# Patient Record
Sex: FEMALE | Race: BLACK OR AFRICAN AMERICAN | Employment: UNEMPLOYED | URBAN - METROPOLITAN AREA
[De-identification: names, ages, dates, MRNs, and addresses within clinical notes are randomized per-mention and may not be internally consistent; named-entity substitution may affect disease eponyms.]

---
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
## 2025-02-05 ENCOUNTER — OFFICE VISIT (OUTPATIENT)
Age: 20
End: 2025-02-05
Payer: MEDICAID

## 2025-02-05 VITALS
OXYGEN SATURATION: 100 % | TEMPERATURE: 97.4 F | HEART RATE: 90 BPM | RESPIRATION RATE: 16 BRPM | WEIGHT: 145.6 LBS | BODY MASS INDEX: 24.86 KG/M2 | HEIGHT: 64 IN | DIASTOLIC BLOOD PRESSURE: 80 MMHG | SYSTOLIC BLOOD PRESSURE: 117 MMHG

## 2025-02-05 DIAGNOSIS — E10.65 TYPE 1 DIABETES MELLITUS WITH HYPERGLYCEMIA (HCC): Primary | ICD-10-CM

## 2025-02-05 DIAGNOSIS — E05.00 GRAVES DISEASE: ICD-10-CM

## 2025-02-05 LAB — HBA1C MFR BLD: 13.2 %

## 2025-02-05 PROCEDURE — 95251 CONT GLUC MNTR ANALYSIS I&R: CPT | Performed by: STUDENT IN AN ORGANIZED HEALTH CARE EDUCATION/TRAINING PROGRAM

## 2025-02-05 PROCEDURE — 83036 HEMOGLOBIN GLYCOSYLATED A1C: CPT | Performed by: STUDENT IN AN ORGANIZED HEALTH CARE EDUCATION/TRAINING PROGRAM

## 2025-02-05 PROCEDURE — 99215 OFFICE O/P EST HI 40 MIN: CPT | Performed by: STUDENT IN AN ORGANIZED HEALTH CARE EDUCATION/TRAINING PROGRAM

## 2025-02-05 RX ORDER — HYDROCHLOROTHIAZIDE 12.5 MG/1
CAPSULE ORAL
Qty: 2 EACH | Refills: 2 | Status: SHIPPED | OUTPATIENT
Start: 2025-02-05

## 2025-02-05 RX ORDER — INSULIN DEGLUDEC 100 U/ML
INJECTION, SOLUTION SUBCUTANEOUS
Qty: 30 ADJUSTABLE DOSE PRE-FILLED PEN SYRINGE | Refills: 3 | Status: SHIPPED | OUTPATIENT
Start: 2025-02-05

## 2025-02-05 RX ORDER — BLOOD SUGAR DIAGNOSTIC
STRIP MISCELLANEOUS
Qty: 200 EACH | Refills: 2 | Status: SHIPPED | OUTPATIENT
Start: 2025-02-05

## 2025-02-05 RX ORDER — LANCETS
EACH MISCELLANEOUS
Qty: 200 EACH | Refills: 2 | Status: SHIPPED | OUTPATIENT
Start: 2025-02-05

## 2025-02-05 ASSESSMENT — PATIENT HEALTH QUESTIONNAIRE - PHQ9
SUM OF ALL RESPONSES TO PHQ QUESTIONS 1-9: 0
SUM OF ALL RESPONSES TO PHQ9 QUESTIONS 1 & 2: 0
2. FEELING DOWN, DEPRESSED OR HOPELESS: NOT AT ALL
SUM OF ALL RESPONSES TO PHQ QUESTIONS 1-9: 0
1. LITTLE INTEREST OR PLEASURE IN DOING THINGS: NOT AT ALL

## 2025-02-05 NOTE — PROGRESS NOTES
[facility] Encounter with Adeline Vanegas for follow up of Type 1 Diabetes. Accompanied today by self.      [address], [facility]    Start time: 9:41 AM EST  End Time: 9:56 AM EST    Total time: 13 minutes spent with this clinician      Complete insulin delivery via: InPen smart pen  helped with set up in new phone so settings match prescribed insulin.  Had her re-pair the pen and was linked after this Has been using two apps so data is in two phones    Insights from device download: Didi 3 with phone ana  data only through 1/25/25 and no meter with her today.  Pt report a lot of trouble getting the Didi 3, discussed differences with Didi 2+ and Didi 3 +.  She requested to see if we can get 3+ and I shared it appears to be on formulary now.     Increased amount of insulin dispensed for her Tresiba as only one box ordered and using 1960 ml in a month    Still reports not ready for an insulin pump. Does not want a second device attached.     Very High (Above 250 mg/dL): 79 %  High (181-249 mg/dl): 12 %  Time in Range (  mg/dl): 9 %  Low (55- 69 mg/dl): 0 %  Very Low (Below 54 mg/dL): 0 %    TDD/TDI: na units     Insulin Instructions  Fixed Dose Injections   Tresiba FlexTouch 100 UNIT/ML Sopn   Last edited by Chuck Short MD on 10/21/2024 at 9:15 AM      Time of Day Dose (units)   Once daily 68     Mealtime Injections   HumaLOG 100 UNIT/ML Soct   Last edited by Chuck Short MD on 8/6/2024 at 9:29 AM      For glucose corrections, patient is instructed to round their insulin dose down to the nearest multiple of 1 unit.      Mealtime Carb Ratio (g/unit) Sensitivity Factor (mg/dL/unit) BG Target (mg/dL)   ALL 6 20 100        Diagnosis date: 11/23/2020   Length of diabetes diagnosis: 4 years      Recent Results (from the past 12 hour(s))   AMB POC HEMOGLOBIN A1C    Collection Time: 02/05/25  9:49 AM   Result Value Ref Range    Hemoglobin A1C, POC 13.2 %       Hemoglobin A1C, POC   Date Value Ref Range
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
## 2025-02-05 NOTE — PATIENT INSTRUCTIONS
Be Prepared-Emergency Supplies  Should be with you anytime you are out of the house; do not leave in a hot car and bring into the office for your appointments    Your rapid acting insulin pen and pen needles- expires after 28 days once open  Alcohol wipes  Ketone strips- judy with expiration date once open  Glucagon emergency kit  Blood glucose meter, test strips, lancing device and lancets  3 sources of treatment for low blood sugar such as Starburst, 4 oz fruit  juice boxes, glucose tabs, skittles, or fruit snacks  If using:  back up Dexcom or Didi glucose sensor-remember to use meter for low Blood glucose alerts  A copy of your insulin dosing.  Provided in your discharge instructions after every visit  [employer] (Didi) Customer Service [phone].       Thyroid labs repeat today    Please call to get a replacement for your failed sensor :    [employer] (Didi) Customer Service [phone].      Roland Butt with [employer]  [phone]    Will send thyroid labs today  Will contact you with results to discuss    Follow-up for type 1 diabetes, Graves' disease.  Hemoglobin A1c today: 13.2% . Target is <7.0%.     Goal Blood Sugars .       Plan  Importance of compliance reinforced   Send us records in a week to review for any insulin dose adjustements  Review checking ketones when vomiting, 2 consecutive blood glucose above 350,  illness  When trace or small drink more water and keep checking until negative. If moderate or large give us a call #[phone]  Target before activity >120, if below get something with carbs,protein and fat (granula bar)     Yearly eye exams are recommended after you have had diabetes for 3-5 years  Dental exams every 6 months are recommended  Flu vaccine is recommended every year, as early in the season as possible  Medical ID should be worn at all times  Continue rotating injection/insertion sites  Annual labs are due:         Insulin regimen      Insulin Instructions  Fixed Dose

## 2025-02-08 DIAGNOSIS — E10.65 TYPE 1 DIABETES MELLITUS WITH HYPERGLYCEMIA (HCC): ICD-10-CM

## 2025-02-10 RX ORDER — INSULIN DEGLUDEC 100 U/ML
INJECTION, SOLUTION SUBCUTANEOUS
Qty: 30 ML | Refills: 3 | Status: SHIPPED | OUTPATIENT
Start: 2025-02-10

## 2025-03-04 ENCOUNTER — TELEPHONE (OUTPATIENT)
Age: 20
End: 2025-03-04

## 2025-03-04 NOTE — TELEPHONE ENCOUNTER
Spoke with pt and she provided fax number and she read back her instructions for tonight and tomorrow.

## 2025-03-04 NOTE — TELEPHONE ENCOUNTER
Patient Adeline says she is having surgery tomorrow and would like to know if it is okay for her to get sedated.    Please advise.    Patient [phone]

## 2025-03-04 NOTE — TELEPHONE ENCOUNTER
Per Dr. Short    Carb ratio : 1:10 tonight at dinner  Tresiba at 60 units tomorrow morning    1 hour before procedure needs to be > 100 mg/dl   Check every hour during procedure and goal is to be > 100 mg/dl     [facility]  Fax number [phone]

## 2025-03-04 NOTE — TELEPHONE ENCOUNTER
Insulin Instructions  Fixed Dose Injections   Tresiba FlexTouch 100 UNIT/ML Sopn   Last edited by Chuck Short MD on 10/21/2024 at 9:15 AM      Time of Day Dose (units)   Once daily 68     Mealtime Injections   HumaLOG 100 UNIT/ML Soct   Last edited by Chuck Short MD on 8/6/2024 at 9:29 AM      For glucose corrections, patient is instructed to round their insulin dose down to the nearest multiple of 1 unit.      Mealtime Carb Ratio (g/unit) Sensitivity Factor (mg/dL/unit) BG Target (mg/dL)   ALL 6 20 100

## 2025-07-31 ENCOUNTER — HOSPITAL ENCOUNTER (INPATIENT)
Facility: HOSPITAL | Age: 20
LOS: 2 days | Discharge: HOME | End: 2025-08-02
Attending: PEDIATRICS | Admitting: PEDIATRICS

## 2025-07-31 DIAGNOSIS — E10.65 TYPE 1 DIABETES MELLITUS WITH HYPERGLYCEMIA (HCC): ICD-10-CM

## 2025-07-31 DIAGNOSIS — E10.10 TYPE 1 DIABETES MELLITUS WITH KETOACIDOSIS WITHOUT COMA (HCC): Primary | ICD-10-CM

## 2025-07-31 DIAGNOSIS — E05.00 THYROTOXICOSIS WITH DIFFUSE GOITER WITHOUT THYROTOXIC CRISIS OR STORM: ICD-10-CM

## 2025-07-31 LAB
ANION GAP BLD CALC-SCNC: 11 (ref 10–20)
ANION GAP BLD CALC-SCNC: 11 (ref 10–20)
ANION GAP BLD CALC-SCNC: ABNORMAL (ref 10–20)
ANION GAP SERPL CALC-SCNC: 19 MMOL/L (ref 2–14)
ANION GAP SERPL CALC-SCNC: 22 MMOL/L (ref 2–14)
ANION GAP SERPL CALC-SCNC: 27 MMOL/L (ref 2–14)
ANION GAP SERPL CALC-SCNC: 28 MMOL/L (ref 2–14)
APPEARANCE UR: CLEAR
BACTERIA URNS QL MICRO: NEGATIVE /HPF
BASE DEFICIT BLD-SCNC: 14.2 MMOL/L
BASE DEFICIT BLD-SCNC: 18.7 MMOL/L
BASE DEFICIT BLD-SCNC: 24.8 MMOL/L
BASE DEFICIT BLD-SCNC: 25.3 MMOL/L
BASE DEFICIT BLD-SCNC: 26.1 MMOL/L
BASE DEFICIT BLD-SCNC: 27.4 MMOL/L
BASE DEFICIT BLD-SCNC: 27.7 MMOL/L
BASOPHILS # BLD: 0.1 K/UL (ref 0–0.1)
BASOPHILS NFR BLD: 0.5 % (ref 0–1)
BILIRUB UR QL: NEGATIVE
BUN SERPL-MCNC: 10 MG/DL (ref 6–20)
BUN SERPL-MCNC: 11 MG/DL (ref 6–20)
BUN SERPL-MCNC: 11 MG/DL (ref 6–20)
BUN SERPL-MCNC: 9 MG/DL (ref 6–20)
BUN/CREAT SERPL: 10 (ref 12–20)
BUN/CREAT SERPL: 11 (ref 12–20)
CA-I BLD-MCNC: 1.06 MMOL/L (ref 1.15–1.33)
CA-I BLD-MCNC: 1.15 MMOL/L (ref 1.15–1.33)
CA-I BLD-MCNC: 1.18 MMOL/L (ref 1.15–1.33)
CA-I BLD-MCNC: 1.21 MMOL/L (ref 1.15–1.33)
CA-I BLD-MCNC: 1.23 MMOL/L (ref 1.15–1.33)
CA-I BLD-MCNC: 1.23 MMOL/L (ref 1.15–1.33)
CA-I BLD-MCNC: 1.27 MMOL/L (ref 1.15–1.33)
CALCIUM SERPL-MCNC: 7.4 MG/DL (ref 8.6–10)
CALCIUM SERPL-MCNC: 8.8 MG/DL (ref 8.6–10)
CALCIUM SERPL-MCNC: 9.2 MG/DL (ref 8.6–10)
CALCIUM SERPL-MCNC: 9.3 MG/DL (ref 8.6–10)
CHLORIDE BLD-SCNC: 118 MMOL/L (ref 100–111)
CHLORIDE BLD-SCNC: 119 MMOL/L (ref 100–111)
CHLORIDE BLD-SCNC: 123 MMOL/L (ref 100–111)
CHLORIDE BLD-SCNC: 124 MMOL/L (ref 100–111)
CHLORIDE BLD-SCNC: 125 MMOL/L (ref 100–111)
CHLORIDE BLD-SCNC: 127 MMOL/L (ref 100–111)
CHLORIDE BLD-SCNC: 130 MMOL/L (ref 100–111)
CHLORIDE SERPL-SCNC: 102 MMOL/L (ref 98–107)
CHLORIDE SERPL-SCNC: 105 MMOL/L (ref 98–107)
CHLORIDE SERPL-SCNC: 116 MMOL/L (ref 98–107)
CHLORIDE SERPL-SCNC: 117 MMOL/L (ref 98–107)
CO2 BLD-SCNC: 10 MMOL/L (ref 22–29)
CO2 BLD-SCNC: 7 MMOL/L (ref 22–29)
CO2 BLD-SCNC: <5 MMOL/L (ref 22–29)
CO2 SERPL-SCNC: 3 MMOL/L (ref 20–29)
CO2 SERPL-SCNC: 3 MMOL/L (ref 20–29)
CO2 SERPL-SCNC: 4 MMOL/L (ref 20–29)
CO2 SERPL-SCNC: 7 MMOL/L (ref 20–29)
COLOR UR: ABNORMAL
COMMENT:: NORMAL
CREAT SERPL-MCNC: 0.91 MG/DL (ref 0.6–1)
CREAT SERPL-MCNC: 0.96 MG/DL (ref 0.6–1)
CREAT SERPL-MCNC: 1.05 MG/DL (ref 0.6–1)
CREAT SERPL-MCNC: 1.15 MG/DL (ref 0.6–1)
CREAT UR-MCNC: 0.6 MG/DL (ref 0.6–1.3)
CREAT UR-MCNC: 0.6 MG/DL (ref 0.6–1.3)
CREAT UR-MCNC: 0.7 MG/DL (ref 0.6–1.3)
CREAT UR-MCNC: 0.8 MG/DL (ref 0.6–1.3)
CREAT UR-MCNC: 0.8 MG/DL (ref 0.6–1.3)
DIFFERENTIAL METHOD BLD: ABNORMAL
EOSINOPHIL # BLD: 0.02 K/UL (ref 0–0.4)
EOSINOPHIL NFR BLD: 0.1 % (ref 0–7)
EPITH CASTS URNS QL MICRO: ABNORMAL /LPF
ERYTHROCYTE [DISTWIDTH] IN BLOOD BY AUTOMATED COUNT: 12.3 % (ref 11.5–14.5)
EST. AVERAGE GLUCOSE BLD GHB EST-MCNC: 375 MG/DL
GLUCOSE BLD STRIP.AUTO-MCNC: 140 MG/DL (ref 65–117)
GLUCOSE BLD STRIP.AUTO-MCNC: 161 MG/DL (ref 65–117)
GLUCOSE BLD STRIP.AUTO-MCNC: 167 MG/DL (ref 65–117)
GLUCOSE BLD STRIP.AUTO-MCNC: 168 MG/DL (ref 65–117)
GLUCOSE BLD STRIP.AUTO-MCNC: 171 MG/DL (ref 74–99)
GLUCOSE BLD STRIP.AUTO-MCNC: 173 MG/DL (ref 65–117)
GLUCOSE BLD STRIP.AUTO-MCNC: 186 MG/DL (ref 65–117)
GLUCOSE BLD STRIP.AUTO-MCNC: 191 MG/DL (ref 65–117)
GLUCOSE BLD STRIP.AUTO-MCNC: 197 MG/DL (ref 74–99)
GLUCOSE BLD STRIP.AUTO-MCNC: 211 MG/DL (ref 65–117)
GLUCOSE BLD STRIP.AUTO-MCNC: 263 MG/DL (ref 65–117)
GLUCOSE BLD STRIP.AUTO-MCNC: 362 MG/DL (ref 74–99)
GLUCOSE BLD STRIP.AUTO-MCNC: 381 MG/DL (ref 65–117)
GLUCOSE BLD STRIP.AUTO-MCNC: 408 MG/DL (ref 74–99)
GLUCOSE BLD STRIP.AUTO-MCNC: 462 MG/DL (ref 65–117)
GLUCOSE BLD STRIP.AUTO-MCNC: 511 MG/DL (ref 65–117)
GLUCOSE BLD STRIP.AUTO-MCNC: 537 MG/DL (ref 65–117)
GLUCOSE BLD STRIP.AUTO-MCNC: 541 MG/DL (ref 74–99)
GLUCOSE BLD STRIP.AUTO-MCNC: >600 MG/DL (ref 65–117)
GLUCOSE BLD STRIP.AUTO-MCNC: >700 MG/DL (ref 74–99)
GLUCOSE BLD STRIP.AUTO-MCNC: >700 MG/DL (ref 74–99)
GLUCOSE SERPL-MCNC: 170 MG/DL (ref 65–100)
GLUCOSE SERPL-MCNC: 377 MG/DL (ref 65–100)
GLUCOSE SERPL-MCNC: 432 MG/DL (ref 65–100)
GLUCOSE SERPL-MCNC: 611 MG/DL (ref 65–100)
GLUCOSE UR STRIP.AUTO-MCNC: >1000 MG/DL
HBA1C MFR BLD: 14.7 % (ref 4–5.6)
HCG UR QL: NEGATIVE
HCO3 BLDA-SCNC: 10 MMOL/L
HCO3 BLDA-SCNC: 3 MMOL/L
HCO3 BLDA-SCNC: 4 MMOL/L
HCO3 BLDA-SCNC: 4 MMOL/L
HCO3 BLDA-SCNC: 7 MMOL/L
HCT VFR BLD AUTO: 44.9 % (ref 35–47)
HGB BLD-MCNC: 14 G/DL (ref 11.5–16)
HGB UR QL STRIP: NEGATIVE
HYALINE CASTS URNS QL MICRO: ABNORMAL /LPF (ref 0–5)
IMM GRANULOCYTES # BLD AUTO: 0.19 K/UL (ref 0–0.04)
IMM GRANULOCYTES NFR BLD AUTO: 1 % (ref 0–0.5)
KETONES UR QL STRIP.AUTO: >80 MG/DL
LACTATE BLD-SCNC: 2.93 MMOL/L (ref 0.4–2)
LACTATE BLD-SCNC: 3.06 MMOL/L (ref 0.4–2)
LACTATE BLD-SCNC: 3.99 MMOL/L (ref 0.4–2)
LACTATE BLD-SCNC: 4.12 MMOL/L (ref 0.4–2)
LACTATE BLD-SCNC: 4.47 MMOL/L (ref 0.4–2)
LACTATE BLD-SCNC: 4.6 MMOL/L (ref 0.4–2)
LEUKOCYTE ESTERASE UR QL STRIP.AUTO: NEGATIVE
LYMPHOCYTES # BLD: 2.6 K/UL (ref 0.8–3.5)
LYMPHOCYTES NFR BLD: 14.1 % (ref 12–49)
MAGNESIUM SERPL-MCNC: 1.9 MG/DL (ref 1.7–2.2)
MAGNESIUM SERPL-MCNC: 2.1 MG/DL (ref 1.7–2.2)
MAGNESIUM SERPL-MCNC: 2.5 MG/DL (ref 1.7–2.2)
MCH RBC QN AUTO: 30.2 PG (ref 26–34)
MCHC RBC AUTO-ENTMCNC: 31.2 G/DL (ref 30–36.5)
MCV RBC AUTO: 97 FL (ref 80–99)
MONOCYTES # BLD: 0.49 K/UL (ref 0–1)
MONOCYTES NFR BLD: 2.7 % (ref 5–13)
NEUTS SEG # BLD: 15.08 K/UL (ref 1.8–8)
NEUTS SEG NFR BLD: 81.6 % (ref 32–75)
NITRITE UR QL STRIP.AUTO: NEGATIVE
NRBC # BLD: 0 K/UL (ref 0–0.01)
NRBC BLD-RTO: 0 PER 100 WBC
PCO2 BLDC: 21.9 MMHG (ref 45–55)
PCO2 BLDV: 11.1 MMHG (ref 41–51)
PCO2 BLDV: 15.8 MMHG (ref 41–51)
PCO2 BLDV: 16.3 MMHG (ref 41–51)
PCO2 BLDV: 17.4 MMHG (ref 41–51)
PCO2 BLDV: 18.2 MMHG (ref 41–51)
PCO2 BLDV: 20.3 MMHG (ref 41–51)
PH BLDC: 7.28 (ref 7.32–7.42)
PH BLDV: 6.93 (ref 7.32–7.42)
PH BLDV: 6.95 (ref 7.32–7.42)
PH BLDV: 6.97 (ref 7.32–7.42)
PH BLDV: 7.02 (ref 7.32–7.42)
PH BLDV: 7.05 (ref 7.32–7.42)
PH BLDV: 7.17 (ref 7.32–7.42)
PH UR STRIP: 5 (ref 5–8)
PHOSPHATE SERPL-MCNC: 2.2 MG/DL (ref 2.4–4.5)
PHOSPHATE SERPL-MCNC: 4.4 MG/DL (ref 2.4–4.5)
PHOSPHATE SERPL-MCNC: 4.5 MG/DL (ref 2.4–4.5)
PLATELET # BLD AUTO: 362 K/UL (ref 150–400)
PMV BLD AUTO: 10.6 FL (ref 8.9–12.9)
PO2 BLDC: 83 MMHG (ref 40–50)
PO2 BLDV: 37 MMHG (ref 25–40)
PO2 BLDV: 47 MMHG (ref 25–40)
PO2 BLDV: 48 MMHG (ref 25–40)
PO2 BLDV: 50 MMHG (ref 25–40)
PO2 BLDV: 68 MMHG (ref 25–40)
PO2 BLDV: 68 MMHG (ref 25–40)
POTASSIUM BLD-SCNC: 4.5 MMOL/L (ref 3.5–5.5)
POTASSIUM BLD-SCNC: 5.3 MMOL/L (ref 3.5–5.5)
POTASSIUM BLD-SCNC: 5.5 MMOL/L (ref 3.5–5.5)
POTASSIUM BLD-SCNC: 5.8 MMOL/L (ref 3.5–5.5)
POTASSIUM BLD-SCNC: 5.9 MMOL/L (ref 3.5–5.5)
POTASSIUM BLD-SCNC: 6.5 MMOL/L (ref 3.5–5.5)
POTASSIUM BLD-SCNC: 7.4 MMOL/L (ref 3.5–5.5)
POTASSIUM SERPL-SCNC: 5.1 MMOL/L (ref 3.5–5.1)
POTASSIUM SERPL-SCNC: 5.5 MMOL/L (ref 3.5–5.1)
POTASSIUM SERPL-SCNC: 6.5 MMOL/L (ref 3.5–5.1)
POTASSIUM SERPL-SCNC: ABNORMAL MMOL/L (ref 3.5–5.1)
PROT UR STRIP-MCNC: ABNORMAL MG/DL
RBC # BLD AUTO: 4.63 M/UL (ref 3.8–5.2)
RBC #/AREA URNS HPF: ABNORMAL /HPF (ref 0–5)
SAO2 % BLD: 58 % (ref 94–98)
SAO2 % BLD: 61 % (ref 94–98)
SAO2 % BLD: 61 % (ref 94–98)
SAO2 % BLD: 63 % (ref 94–98)
SAO2 % BLD: 79 % (ref 94–98)
SAO2 % BLD: 84 % (ref 94–98)
SAO2 % BLD: 95 % (ref 94–98)
SERVICE CMNT-IMP: ABNORMAL
SODIUM BLD-SCNC: 121 MMOL/L (ref 136–145)
SODIUM BLD-SCNC: 130 MMOL/L (ref 136–145)
SODIUM BLD-SCNC: 135 MMOL/L (ref 136–145)
SODIUM BLD-SCNC: 139 MMOL/L (ref 136–145)
SODIUM BLD-SCNC: 143 MMOL/L (ref 136–145)
SODIUM BLD-SCNC: 145 MMOL/L (ref 136–145)
SODIUM BLD-SCNC: 146 MMOL/L (ref 136–145)
SODIUM SERPL-SCNC: 132 MMOL/L (ref 136–145)
SODIUM SERPL-SCNC: 136 MMOL/L (ref 136–145)
SODIUM SERPL-SCNC: 142 MMOL/L (ref 136–145)
SODIUM SERPL-SCNC: 143 MMOL/L (ref 136–145)
SP GR UR REFRACTOMETRY: 1.02 (ref 1–1.03)
SPECIMEN HOLD: NORMAL
SPECIMEN HOLD: NORMAL
SPECIMEN SITE: ABNORMAL
T4 FREE SERPL-MCNC: 0.9 NG/DL (ref 0.9–1.6)
TSH, 3RD GENERATION: 0.38 UIU/ML (ref 0.27–4.2)
UROBILINOGEN UR QL STRIP.AUTO: 0.2 EU/DL (ref 0.2–1)
WBC # BLD AUTO: 18.5 K/UL (ref 3.6–11)
WBC URNS QL MICRO: ABNORMAL /HPF (ref 0–4)

## 2025-07-31 PROCEDURE — 6370000000 HC RX 637 (ALT 250 FOR IP): Performed by: PEDIATRICS

## 2025-07-31 PROCEDURE — 84439 ASSAY OF FREE THYROXINE: CPT

## 2025-07-31 PROCEDURE — 6360000002 HC RX W HCPCS: Performed by: PEDIATRICS

## 2025-07-31 PROCEDURE — 83735 ASSAY OF MAGNESIUM: CPT

## 2025-07-31 PROCEDURE — 80048 BASIC METABOLIC PNL TOTAL CA: CPT

## 2025-07-31 PROCEDURE — 82947 ASSAY GLUCOSE BLOOD QUANT: CPT

## 2025-07-31 PROCEDURE — 84100 ASSAY OF PHOSPHORUS: CPT

## 2025-07-31 PROCEDURE — 83036 HEMOGLOBIN GLYCOSYLATED A1C: CPT

## 2025-07-31 PROCEDURE — 2500000003 HC RX 250 WO HCPCS: Performed by: PEDIATRICS

## 2025-07-31 PROCEDURE — 81025 URINE PREGNANCY TEST: CPT

## 2025-07-31 PROCEDURE — 82330 ASSAY OF CALCIUM: CPT

## 2025-07-31 PROCEDURE — 2030000000 HC ICU PEDIATRIC R&B

## 2025-07-31 PROCEDURE — 85025 COMPLETE CBC W/AUTO DIFF WBC: CPT

## 2025-07-31 PROCEDURE — 84132 ASSAY OF SERUM POTASSIUM: CPT

## 2025-07-31 PROCEDURE — 81001 URINALYSIS AUTO W/SCOPE: CPT

## 2025-07-31 PROCEDURE — 2580000003 HC RX 258: Performed by: PEDIATRICS

## 2025-07-31 PROCEDURE — 82803 BLOOD GASES ANY COMBINATION: CPT

## 2025-07-31 PROCEDURE — 99285 EMERGENCY DEPT VISIT HI MDM: CPT

## 2025-07-31 PROCEDURE — 82962 GLUCOSE BLOOD TEST: CPT

## 2025-07-31 PROCEDURE — 84295 ASSAY OF SERUM SODIUM: CPT

## 2025-07-31 PROCEDURE — 84443 ASSAY THYROID STIM HORMONE: CPT

## 2025-07-31 PROCEDURE — 99223 1ST HOSP IP/OBS HIGH 75: CPT | Performed by: PEDIATRICS

## 2025-07-31 RX ORDER — METHIMAZOLE 5 MG/1
20 TABLET ORAL 2 TIMES DAILY
Status: DISCONTINUED | OUTPATIENT
Start: 2025-07-31 | End: 2025-08-02 | Stop reason: HOSPADM

## 2025-07-31 RX ORDER — DIAZEPAM 10 MG/2ML
2.5 INJECTION, SOLUTION INTRAMUSCULAR; INTRAVENOUS EVERY 4 HOURS PRN
Status: DISCONTINUED | OUTPATIENT
Start: 2025-07-31 | End: 2025-08-01

## 2025-07-31 RX ORDER — SODIUM CHLORIDE 9 MG/ML
INJECTION, SOLUTION INTRAVENOUS ONCE
Status: COMPLETED | OUTPATIENT
Start: 2025-07-31 | End: 2025-07-31

## 2025-07-31 RX ORDER — KETOROLAC TROMETHAMINE 30 MG/ML
30 INJECTION, SOLUTION INTRAMUSCULAR; INTRAVENOUS ONCE
Status: COMPLETED | OUTPATIENT
Start: 2025-07-31 | End: 2025-07-31

## 2025-07-31 RX ORDER — SODIUM CHLORIDE 9 MG/ML
INJECTION, SOLUTION INTRAVENOUS CONTINUOUS
Status: DISCONTINUED | OUTPATIENT
Start: 2025-07-31 | End: 2025-08-02

## 2025-07-31 RX ORDER — CALCIUM GLUCONATE 20 MG/ML
1000 INJECTION, SOLUTION INTRAVENOUS ONCE
Status: COMPLETED | OUTPATIENT
Start: 2025-07-31 | End: 2025-07-31

## 2025-07-31 RX ORDER — ACETAMINOPHEN 325 MG/1
650 TABLET ORAL EVERY 4 HOURS PRN
Status: DISCONTINUED | OUTPATIENT
Start: 2025-07-31 | End: 2025-08-02 | Stop reason: HOSPADM

## 2025-07-31 RX ORDER — CALCIUM GLUCONATE 98 MG/ML
1000 INJECTION, SOLUTION INTRAVENOUS ONCE
Status: DISCONTINUED | OUTPATIENT
Start: 2025-07-31 | End: 2025-07-31 | Stop reason: SDUPTHER

## 2025-07-31 RX ADMIN — SODIUM CHLORIDE 0.05 UNITS/KG/HR: 9 INJECTION, SOLUTION INTRAVENOUS at 09:32

## 2025-07-31 RX ADMIN — DIAZEPAM 2.5 MG: 5 INJECTION, SOLUTION INTRAMUSCULAR; INTRAVENOUS at 09:50

## 2025-07-31 RX ADMIN — POTASSIUM PHOSPHATE, MONOBASIC POTASSIUM PHOSPHATE, DIBASIC: 224; 236 INJECTION, SOLUTION, CONCENTRATE INTRAVENOUS at 09:57

## 2025-07-31 RX ADMIN — CALCIUM GLUCONATE 1000 MG: 20 INJECTION, SOLUTION INTRAVENOUS at 18:02

## 2025-07-31 RX ADMIN — POTASSIUM PHOSPHATE, MONOBASIC POTASSIUM PHOSPHATE, DIBASIC: 224; 236 INJECTION, SOLUTION, CONCENTRATE INTRAVENOUS at 16:48

## 2025-07-31 RX ADMIN — SODIUM CHLORIDE: 0.9 INJECTION, SOLUTION INTRAVENOUS at 12:27

## 2025-07-31 RX ADMIN — KETOROLAC TROMETHAMINE 30 MG: 30 INJECTION, SOLUTION INTRAMUSCULAR; INTRAVENOUS at 09:17

## 2025-07-31 RX ADMIN — SODIUM CHLORIDE: 0.9 INJECTION, SOLUTION INTRAVENOUS at 08:37

## 2025-07-31 RX ADMIN — METHIMAZOLE 20 MG: 5 TABLET ORAL at 20:25

## 2025-07-31 RX ADMIN — ACETAMINOPHEN 650 MG: 325 TABLET ORAL at 20:25

## 2025-07-31 RX ADMIN — ACETAMINOPHEN 650 MG: 325 TABLET ORAL at 15:07

## 2025-07-31 ASSESSMENT — PAIN SCALES - GENERAL
PAINLEVEL_OUTOF10: 3
PAINLEVEL_OUTOF10: 7
PAINLEVEL_OUTOF10: 7
PAINLEVEL_OUTOF10: 0
PAINLEVEL_OUTOF10: 6

## 2025-07-31 ASSESSMENT — PAIN DESCRIPTION - LOCATION
LOCATION: RIB CAGE
LOCATION: HEAD
LOCATION: ABDOMEN;CHEST

## 2025-07-31 ASSESSMENT — PAIN DESCRIPTION - DESCRIPTORS
DESCRIPTORS: ACHING
DESCRIPTORS: ACHING

## 2025-07-31 ASSESSMENT — PAIN DESCRIPTION - PAIN TYPE: TYPE: ACUTE PAIN

## 2025-07-31 NOTE — CONSULTS
Cc: Type 1 diabetes- poorly controlled and poor compliance with medication         Admitted for Diabetes ketoacidosis    \[facility]\"": Patient is 19 year old with type 1 diabetes admitted for diabetes ketoacidosis. The patient is currently on Insulin regimen at home which includes humalog  before each meal and Tresiba once a day. Compliance with medication: poor. As per the mother who is at bedside, patient could not have medication for diabetes covered. She has been titration herself less insulin dose to have her supplies last long.  Overall glycemic control has been poor. She presented with respiratory distress, lethargy and labs was consistent with hyperglycemia and was in severe DKA.   She is on IV insulin therapy and IV fluids management.    She has hyperthyroidism and is on methimazole.      Past Medical History:   Diagnosis Date    Asthma     Chronic idiopathic constipation 04/25/2018    Concussion     Diabetes (HCC)     Hyperthyroidism 11/20/2020    Migraines     Otitis media        Past Surgical History:   Procedure Laterality Date    CATARACT EXTRACTION Bilateral     TYMPANOSTOMY TUBE PLACEMENT      WISDOM TOOTH EXTRACTION         Social History     Socioeconomic History    Marital status: Single     Spouse name: Not on file    Number of children: Not on file    Years of education: Not on file    Highest education level: Not on file   Occupational History    Not on file   Tobacco Use    Smoking status: Never    Smokeless tobacco: Never   Substance and Sexual Activity    Alcohol use: No    Drug use: No    Sexual activity: Not on file   Other Topics Concern    Not on file   Social History Narrative    Not on file     Social Drivers of Health     Financial Resource Strain: Not on file   Food Insecurity: No Food Insecurity (7/31/2025)    Hunger Vital Sign     Worried About Running Out of Food in the Last Year: Never true     Ran Out of Food in the Last Year: Never true   Transportation Needs: Unmet Transportation  Needs (7/31/2025)    PRAPARE - Transportation     Lack of Transportation (Medical): Yes     Lack of Transportation (Non-Medical): Yes   Physical Activity: Not on file   Stress: Not on file   Social Connections: Not on file   Intimate Partner Violence: Not on file   Housing Stability: High Risk (7/31/2025)    Housing Stability Vital Sign     Unable to Pay for Housing in the Last Year: Yes     Number of Times Moved in the Last Year: 0     Homeless in the Last Year: No       Family History   Problem Relation Age of Onset    Heart Disease Other     Hypertension Maternal Grandmother     Thyroid Disease Mother     Asthma Father     GERD Father     Asthma Sister     Asthma Brother     Diabetes Maternal Grandmother       ROS: lethargy, cannot respond to the questions..     /84   Pulse (!) 135   Temp 98.5 °F (36.9 °C) (Axillary)   Resp 28   Ht 1.6 m (5' 3\")   Wt 62.1 kg (136 lb 14.5 oz)   LMP 07/24/2025 (Approximate)   SpO2 90%   BMI 24.25 kg/m²     Tachycardia, orientation: lethargic and has kusummal breathing. Abdomen: no distension, no pedal edema    A/P:  Type 1 diabetes  Admitted for Diabetes ketoacidosis: severe ketoacidosis  Moderate dehydration  Lethargy  Hyperthyroidism-- biochemically euthyroid    Lab Results   Component Value Date    TSH 0.377 07/31/2025    U6DQAWJ 104 09/17/2024    FT3 >30.0 (H) 11/20/2020    T4FREE 0.9 07/31/2025       Insulin dosing: and IV fluids per PICU protocol.  Lab Results   Component Value Date/Time     07/31/2025 12:20 PM    K 6.5 07/31/2025 12:20 PM     07/31/2025 12:20 PM    CO2 3 07/31/2025 12:20 PM    BUN 11 07/31/2025 12:20 PM    CREATININE 0.6 07/31/2025 04:25 PM    CREATININE 1.15 07/31/2025 12:20 PM    GLUCOSE 432 07/31/2025 12:20 PM    CALCIUM 9.2 07/31/2025 12:20 PM    LABGLOM 70 07/31/2025 12:20 PM    LABGLOM >60 12/07/2023 02:13 AM    LABGLOM Cannot be calculated 12/16/2022 06:15 AM      Hemoglobin A1C   Date Value Ref Range Status   07/31/2025 14.7

## 2025-07-31 NOTE — ED TRIAGE NOTES
Per EMS: pt called for nausea and vomiting. Pt reports hx of diabetes. Pt ran out of long acting insulin. Last dose of short acting insulin was yesterday. Pt received about 600ml of NS en route and arrived on O2 nasal cannula for SOB.

## 2025-07-31 NOTE — BH NOTE
Patient and Family Check in    This worker met with the patient and his mother at bedside both in the morning and in the afternoon.  Patient was in DKA and was not able to effectively communicate to this worker. Father of patient left and mother of patient was not forthcoming with information. 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 This worker offered to return tomorrow to check in with Adeline and speak with her about her needs.

## 2025-07-31 NOTE — ED PROVIDER NOTES
DISPOSITION/PLAN   DISPOSITION Admitted 07/31/2025 08:13:50 AM      PATIENT REFERRED TO:  No follow-up provider specified.    DISCHARGE MEDICATIONS:  Current Discharge Medication List            (Please note that portions of this note were completed with a voice recognition program.  Efforts were made to edit the dictations but occasionally words are mis-transcribed.)    Dinah Garcia MD (electronically signed)  Emergency Attending Physician / Physician Assistant / Nurse Practitioner             Dinah Garcia MD  07/31/25 8183

## 2025-07-31 NOTE — CARE COORDINATION
Care Management Initial Assessment       RUR: 12%--low  Readmission? No  1st IM letter given? No  1st  letter given: No    Emergency contact: Tiffanie Oneil, mother, [phone]     CM consult placed for assistance with insurance and medications. Patient admitted for DKA with history of Diabetes type 1, Graves disease, cataracts and PTSD. CM met with patient and mother at bedside. Patient was sleeping during encounter. CM introduced herself and role. Demographics confirmed. Patient resides in a home with her mother and three siblings. Mother explained that Medicaid coverage was ended in February or March. Mother applied for a plan through the Marketplace, but was unable to add patient due to her age. Patient has been applied for Medicaid and has the application number on her phone. CM emailed [facility] staff to provide them with this information. Patient does need a new PCP. Patient will most likely need assistance with prescription assistance as this hospitalization is due to her running out of her long-acting insulin. CM will continue to follow for discharge needs.     07/31/25 9227   Service Assessment   Patient Orientation Unable to Assess  (patient sleeping)   History Provided By Child/Family   Primary Caregiver Family   Accompanied By/Relationship mother   Support Systems Parent   PCP Verified by CM No  (has no current pediatrician)   Prior Functional Level Independent in ADLs/IADLs   Current Functional Level Independent in ADLs/IADLs   Can patient return to prior living arrangement Yes   Ability to make needs known: Good   Family able to assist with home care needs: Yes   Would you like for me to discuss the discharge plan with any other family members/significant others, and if so, who? Yes  (mother)   Financial Resources None   Community Resources None   CM/SW Referral Financial;No PCP;Other (see comment)  (medication assistance)   Social/Functional History   Lives With Parent   Type of Home House

## 2025-07-31 NOTE — H&P
Pediatric  Intensive Care History and Physical    Subjective:        Subjective:     Critical Care Initial Evaluation Note: 7/31/2025 9:18 AM    Chief Complaint: labored breathing, headache and abdominal pain    HPI: 19 year old with history of IDDM, Graves Disease and PTSD who presented to the ED tonight with the complaint of labored breathing and elevated blood sugar level.  Patient states she has run out of her long acting insulin due to insurance issues with coverage and has been rationing her short acting insulin with her last dose being last night. Today with labored breathing and headache/abdominal pain which felt like the last time she was in DKA so she came to the hospital.  Denies any fevers, URI symptoms, V/D at home.    In the ED she was noted to have severe metabolic acidosis, Kussmaul respiratory pattern and hyperglycemia consistent with DKA and was given a NS bolus and admitted for further management and close neurologic monitoring.     Past Medical History:   Diagnosis Date    Asthma     Chronic idiopathic constipation 04/25/2018    Concussion     Diabetes (HCC)     Hyperthyroidism 11/20/2020    Migraines     Otitis media       Past Surgical History:   Procedure Laterality Date    CATARACT EXTRACTION Bilateral     TYMPANOSTOMY TUBE PLACEMENT      WISDOM TOOTH EXTRACTION        Prior to Admission medications    Medication Sig Start Date End Date Taking? Authorizing Provider   Insulin Degludec (TRESIBA FLEXTOUCH) 100 UNIT/ML SOPN AS DIRECTED INJECT UP TO 70 UNITS SUBCUTANEOUSLY DAILY 2/10/25   Chuck Short MD   Continuous Glucose Sensor (FREESTYLE SAMIA 3 PLUS SENSOR) MISC To be used to check BG continuously. Change every 15 days 2/5/25   Chuck Short MD   blood glucose test strips (ACCU-CHEK GUIDE) strip Test blood sugar up to 6x daily. 2/5/25   Chuck Short MD   Accu-Chek Softclix Lancets MISC Use to check BG 4-6 times daily 2/5/25   Chuck Short MD   Injection Device for Insulin

## 2025-07-31 NOTE — BH NOTE
Family/Patient Check in    This worker met with patient in the morning when she arrived in the PICU and later in the day (appox. 1 pm) to check back in. Both times, the patient's breathing was labored and she wasn't able to hold a conversation. This worker attempted to speak to mother of patient who was only able to identify that her daughter lived with her, but was not managing her insulin well. She reported that they were attempting to restore her medicaid,but they were running into barriers.  The last time she refilled her prescriptions, was in February and since that time she has been half-dosing her long acting medication.  She has had surgery on her eyes due to T1D and she has not reached out to the pediatric endocrine office for support.  This worker reiterated our hope to support the patient both with medication needs and counseling.  Mother of patient reported that she worked in the \"mental health\" field and they had it figured out. This worker offered to have her work with a counselor that has experience with chronic conditions and T1D.  She thanked this worker.  Information for contact was left on the white board. The patient did remember this worker from previous admissions. Will return tomorrow to speak directly with Adeline about continued support and managing her health.

## 2025-07-31 NOTE — ED NOTES
TRANSFER - OUT REPORT:    Verbal report given to Lisa on Adeline Vanegas  being transferred to PICU for routine progression of patient care       Report consisted of patient's Situation, Background, Assessment and   Recommendations(SBAR).     Information from the following report(s) Nurse Handoff Report, ED Encounter Summary, ED SBAR, Intake/Output, MAR, and Recent Results was reviewed with the receiving nurse.    [place] Fall Assessment:    Presents to emergency department  because of falls (Syncope, seizure, or loss of consciousness): No  Age > 70: No  Altered Mental Status, Intoxication with alcohol or substance confusion (Disorientation, impaired judgment, poor safety awaremess, or inability to follow instructions): No  Impaired Mobility: Ambulates or transfers with assistive devices or assistance; Unable to ambulate or transer.: No  Nursing Judgement: Yes          Lines:   Peripheral IV 07/31/25 Right Forearm (Active)   Site Assessment Clean, dry & intact 07/31/25 0811   Line Status Blood return noted 07/31/25 0811   Phlebitis Assessment No symptoms 07/31/25 0811   Infiltration Assessment 0 07/31/25 0811   Dressing Status Clean, dry & intact 07/31/25 0811   Dressing Type Transparent 07/31/25 0811   Dressing Intervention New 07/31/25 0811        Opportunity for questions and clarification was provided.      Patient transported with:  Registered Nurse

## 2025-08-01 ENCOUNTER — TELEPHONE (OUTPATIENT)
Age: 20
End: 2025-08-01

## 2025-08-01 DIAGNOSIS — E10.65 TYPE 1 DIABETES MELLITUS WITH HYPERGLYCEMIA (HCC): ICD-10-CM

## 2025-08-01 LAB
ANION GAP BLD CALC-SCNC: 13 (ref 10–20)
ANION GAP BLD CALC-SCNC: 15 (ref 10–20)
ANION GAP SERPL CALC-SCNC: 11 MMOL/L (ref 2–14)
ANION GAP SERPL CALC-SCNC: 14 MMOL/L (ref 2–14)
ANION GAP SERPL CALC-SCNC: 16 MMOL/L (ref 2–14)
BASE DEFICIT BLD-SCNC: 11.3 MMOL/L
BASE DEFICIT BLD-SCNC: 4.2 MMOL/L
BUN SERPL-MCNC: 5 MG/DL (ref 6–20)
BUN SERPL-MCNC: 5 MG/DL (ref 6–20)
BUN SERPL-MCNC: 7 MG/DL (ref 6–20)
BUN SERPL-MCNC: 8 MG/DL (ref 6–20)
BUN SERPL-MCNC: 9 MG/DL (ref 6–20)
CA-I BLD-MCNC: 1.04 MMOL/L (ref 1.15–1.33)
CA-I BLD-MCNC: 1.22 MMOL/L (ref 1.15–1.33)
CALCIUM SERPL-MCNC: 6.6 MG/DL (ref 8.6–10)
CALCIUM SERPL-MCNC: 8.2 MG/DL (ref 8.6–10)
CALCIUM SERPL-MCNC: 8.6 MG/DL (ref 8.6–10)
CHLORIDE BLD-SCNC: 110 MMOL/L (ref 100–111)
CHLORIDE BLD-SCNC: 119 MMOL/L (ref 100–111)
CHLORIDE SERPL-SCNC: 109 MMOL/L (ref 98–107)
CHLORIDE SERPL-SCNC: 117 MMOL/L (ref 98–107)
CHLORIDE SERPL-SCNC: 117 MMOL/L (ref 98–107)
CHLORIDE SERPL-SCNC: 118 MMOL/L (ref 98–107)
CHLORIDE SERPL-SCNC: 120 MMOL/L (ref 98–107)
CO2 BLD-SCNC: 14 MMOL/L (ref 22–29)
CO2 BLD-SCNC: 18 MMOL/L (ref 22–29)
CO2 SERPL-SCNC: 11 MMOL/L (ref 20–29)
CO2 SERPL-SCNC: 12 MMOL/L (ref 20–29)
CO2 SERPL-SCNC: 12 MMOL/L (ref 20–29)
CO2 SERPL-SCNC: 17 MMOL/L (ref 20–29)
CO2 SERPL-SCNC: 8 MMOL/L (ref 20–29)
COMMENT:: NORMAL
CREAT SERPL-MCNC: 0.55 MG/DL (ref 0.6–1)
CREAT SERPL-MCNC: 0.68 MG/DL (ref 0.6–1)
CREAT SERPL-MCNC: 0.68 MG/DL (ref 0.6–1)
CREAT SERPL-MCNC: 0.78 MG/DL (ref 0.6–1)
CREAT SERPL-MCNC: 0.85 MG/DL (ref 0.6–1)
CREAT UR-MCNC: 0.6 MG/DL (ref 0.6–1.3)
CREAT UR-MCNC: 0.6 MG/DL (ref 0.6–1.3)
GLUCOSE BLD STRIP.AUTO-MCNC: 104 MG/DL (ref 65–117)
GLUCOSE BLD STRIP.AUTO-MCNC: 104 MG/DL (ref 74–99)
GLUCOSE BLD STRIP.AUTO-MCNC: 107 MG/DL (ref 65–117)
GLUCOSE BLD STRIP.AUTO-MCNC: 108 MG/DL (ref 65–117)
GLUCOSE BLD STRIP.AUTO-MCNC: 110 MG/DL (ref 65–117)
GLUCOSE BLD STRIP.AUTO-MCNC: 113 MG/DL (ref 65–117)
GLUCOSE BLD STRIP.AUTO-MCNC: 114 MG/DL (ref 65–117)
GLUCOSE BLD STRIP.AUTO-MCNC: 114 MG/DL (ref 65–117)
GLUCOSE BLD STRIP.AUTO-MCNC: 115 MG/DL (ref 65–117)
GLUCOSE BLD STRIP.AUTO-MCNC: 120 MG/DL (ref 65–117)
GLUCOSE BLD STRIP.AUTO-MCNC: 121 MG/DL (ref 65–117)
GLUCOSE BLD STRIP.AUTO-MCNC: 135 MG/DL (ref 65–117)
GLUCOSE BLD STRIP.AUTO-MCNC: 146 MG/DL (ref 65–117)
GLUCOSE BLD STRIP.AUTO-MCNC: 73 MG/DL (ref 65–117)
GLUCOSE BLD STRIP.AUTO-MCNC: 79 MG/DL (ref 65–117)
GLUCOSE BLD STRIP.AUTO-MCNC: 90 MG/DL (ref 65–117)
GLUCOSE BLD STRIP.AUTO-MCNC: 90 MG/DL (ref 74–99)
GLUCOSE BLD STRIP.AUTO-MCNC: 93 MG/DL (ref 65–117)
GLUCOSE BLD STRIP.AUTO-MCNC: 95 MG/DL (ref 65–117)
GLUCOSE BLD STRIP.AUTO-MCNC: 95 MG/DL (ref 65–117)
GLUCOSE BLD STRIP.AUTO-MCNC: 96 MG/DL (ref 65–117)
GLUCOSE BLD STRIP.AUTO-MCNC: 97 MG/DL (ref 65–117)
GLUCOSE BLD STRIP.AUTO-MCNC: 99 MG/DL (ref 65–117)
GLUCOSE SERPL-MCNC: 101 MG/DL (ref 65–100)
GLUCOSE SERPL-MCNC: 114 MG/DL (ref 65–100)
GLUCOSE SERPL-MCNC: 122 MG/DL (ref 65–100)
GLUCOSE SERPL-MCNC: 153 MG/DL (ref 65–100)
GLUCOSE SERPL-MCNC: 82 MG/DL (ref 65–100)
HCO3 BLDA-SCNC: 14 MMOL/L
HCO3 BLDA-SCNC: 19 MMOL/L
LACTATE BLD-SCNC: 2.53 MMOL/L (ref 0.4–2)
LACTATE BLD-SCNC: 4.15 MMOL/L (ref 0.4–2)
MAGNESIUM SERPL-MCNC: 1.5 MG/DL (ref 1.7–2.2)
MAGNESIUM SERPL-MCNC: 1.9 MG/DL (ref 1.7–2.2)
PCO2 BLDV: 30 MMHG (ref 41–51)
PCO2 BLDV: 30.2 MMHG (ref 41–51)
PH BLDV: 7.28 (ref 7.32–7.42)
PH BLDV: 7.41 (ref 7.32–7.42)
PHOSPHATE SERPL-MCNC: 1.8 MG/DL (ref 2.4–4.5)
PHOSPHATE SERPL-MCNC: 1.9 MG/DL (ref 2.4–4.5)
PHOSPHATE SERPL-MCNC: 2 MG/DL (ref 2.5–4.5)
PHOSPHATE SERPL-MCNC: 2.1 MG/DL (ref 2.5–4.5)
PHOSPHATE SERPL-MCNC: 4 MG/DL (ref 2.4–4.5)
PO2 BLDV: 27 MMHG (ref 25–40)
PO2 BLDV: 49 MMHG (ref 25–40)
POTASSIUM BLD-SCNC: 2.8 MMOL/L (ref 3.5–5.5)
POTASSIUM BLD-SCNC: 3 MMOL/L (ref 3.5–5.5)
POTASSIUM SERPL-SCNC: 2.9 MMOL/L (ref 3.5–5.1)
POTASSIUM SERPL-SCNC: 3 MMOL/L (ref 3.5–5.1)
POTASSIUM SERPL-SCNC: 3.8 MMOL/L (ref 3.5–5.1)
POTASSIUM SERPL-SCNC: 3.8 MMOL/L (ref 3.5–5.1)
POTASSIUM SERPL-SCNC: 4.1 MMOL/L (ref 3.5–5.1)
SAO2 % BLD: 44 % (ref 94–98)
SAO2 % BLD: 86 % (ref 94–98)
SERVICE CMNT-IMP: ABNORMAL
SERVICE CMNT-IMP: NORMAL
SODIUM BLD-SCNC: 141 MMOL/L (ref 136–145)
SODIUM BLD-SCNC: 148 MMOL/L (ref 136–145)
SODIUM SERPL-SCNC: 140 MMOL/L (ref 136–145)
SODIUM SERPL-SCNC: 141 MMOL/L (ref 136–145)
SODIUM SERPL-SCNC: 142 MMOL/L (ref 136–145)
SPECIMEN HOLD: NORMAL
SPECIMEN SITE: ABNORMAL
SPECIMEN SITE: ABNORMAL
T4 FREE SERPL-MCNC: 0.8 NG/DL (ref 0.9–1.6)
TSH, 3RD GENERATION: 0.23 UIU/ML (ref 0.27–4.2)

## 2025-08-01 PROCEDURE — 6370000000 HC RX 637 (ALT 250 FOR IP): Performed by: PEDIATRICS

## 2025-08-01 PROCEDURE — 84100 ASSAY OF PHOSPHORUS: CPT

## 2025-08-01 PROCEDURE — 2500000003 HC RX 250 WO HCPCS: Performed by: PEDIATRICS

## 2025-08-01 PROCEDURE — 6360000002 HC RX W HCPCS: Performed by: PEDIATRICS

## 2025-08-01 PROCEDURE — 84295 ASSAY OF SERUM SODIUM: CPT

## 2025-08-01 PROCEDURE — 82947 ASSAY GLUCOSE BLOOD QUANT: CPT

## 2025-08-01 PROCEDURE — 82803 BLOOD GASES ANY COMBINATION: CPT

## 2025-08-01 PROCEDURE — 99233 SBSQ HOSP IP/OBS HIGH 50: CPT | Performed by: PEDIATRICS

## 2025-08-01 PROCEDURE — 83735 ASSAY OF MAGNESIUM: CPT

## 2025-08-01 PROCEDURE — 82962 GLUCOSE BLOOD TEST: CPT

## 2025-08-01 PROCEDURE — 82330 ASSAY OF CALCIUM: CPT

## 2025-08-01 PROCEDURE — 80048 BASIC METABOLIC PNL TOTAL CA: CPT

## 2025-08-01 PROCEDURE — 84443 ASSAY THYROID STIM HORMONE: CPT

## 2025-08-01 PROCEDURE — 2030000000 HC ICU PEDIATRIC R&B

## 2025-08-01 PROCEDURE — 2580000003 HC RX 258: Performed by: PEDIATRICS

## 2025-08-01 PROCEDURE — 84132 ASSAY OF SERUM POTASSIUM: CPT

## 2025-08-01 PROCEDURE — 84439 ASSAY OF FREE THYROXINE: CPT

## 2025-08-01 RX ORDER — INSULIN GLARGINE 100 [IU]/ML
INJECTION, SOLUTION SUBCUTANEOUS
Qty: 10 ADJUSTABLE DOSE PRE-FILLED PEN SYRINGE | Refills: 0 | Status: SHIPPED | OUTPATIENT
Start: 2025-08-01 | End: 2025-08-02

## 2025-08-01 RX ORDER — CALCIUM GLUCONATE 98 MG/ML
1000 INJECTION, SOLUTION INTRAVENOUS ONCE
Status: DISCONTINUED | OUTPATIENT
Start: 2025-08-01 | End: 2025-08-01 | Stop reason: SDUPTHER

## 2025-08-01 RX ORDER — MAGNESIUM SULFATE HEPTAHYDRATE 40 MG/ML
1000 INJECTION, SOLUTION INTRAVENOUS ONCE
Status: DISCONTINUED | OUTPATIENT
Start: 2025-08-01 | End: 2025-08-01 | Stop reason: SDUPTHER

## 2025-08-01 RX ORDER — GLUCAGON INJECTION, SOLUTION 1 MG/.2ML
INJECTION, SOLUTION SUBCUTANEOUS
Qty: 1 EACH | Refills: 0 | Status: SHIPPED | OUTPATIENT
Start: 2025-08-01 | End: 2025-08-01

## 2025-08-01 RX ORDER — CALCIUM GLUCONATE 20 MG/ML
1000 INJECTION, SOLUTION INTRAVENOUS ONCE
Status: COMPLETED | OUTPATIENT
Start: 2025-08-01 | End: 2025-08-01

## 2025-08-01 RX ORDER — INSULIN LISPRO 100 [IU]/ML
INJECTION, SOLUTION SUBCUTANEOUS
Qty: 30 ML | Refills: 0 | Status: SHIPPED | OUTPATIENT
Start: 2025-08-01

## 2025-08-01 RX ORDER — MAGNESIUM SULFATE 1 G/100ML
1000 INJECTION INTRAVENOUS ONCE
Status: COMPLETED | OUTPATIENT
Start: 2025-08-01 | End: 2025-08-01

## 2025-08-01 RX ORDER — METHIMAZOLE 10 MG/1
TABLET ORAL
Qty: 120 TABLET | Refills: 1 | Status: SHIPPED | OUTPATIENT
Start: 2025-08-01

## 2025-08-01 RX ORDER — METHIMAZOLE 10 MG/1
TABLET ORAL
Qty: 120 TABLET | Refills: 1 | Status: SHIPPED | OUTPATIENT
Start: 2025-08-01 | End: 2025-08-01

## 2025-08-01 RX ORDER — BLOOD-GLUCOSE METER
EACH MISCELLANEOUS
Qty: 1 KIT | Refills: 0 | Status: SHIPPED | COMMUNITY
Start: 2025-08-01 | End: 2025-08-01

## 2025-08-01 RX ORDER — LANCETS
EACH MISCELLANEOUS
Qty: 200 EACH | Refills: 2 | Status: SHIPPED | OUTPATIENT
Start: 2025-08-01

## 2025-08-01 RX ORDER — ACETAMINOPHEN, DEXTROMETHORPHAN HBR, DOXYLAMINE SUCCINATE 650; 30; 12.5 MG/30ML; MG/30ML; MG/30ML
1 LIQUID ORAL
Qty: 200 EACH | Refills: 0 | Status: SHIPPED | OUTPATIENT
Start: 2025-08-01

## 2025-08-01 RX ORDER — IBUPROFEN 600 MG/1
1 TABLET ORAL
Qty: 1 KIT | Refills: 0 | Status: SHIPPED | OUTPATIENT
Start: 2025-08-01

## 2025-08-01 RX ORDER — ACYCLOVIR 800 MG/1
TABLET ORAL
Qty: 1 EACH | Refills: 0 | Status: SHIPPED | COMMUNITY
Start: 2025-08-01 | End: 2025-08-01

## 2025-08-01 RX ORDER — INSULIN LISPRO 100 [IU]/ML
1-25 INJECTION, SOLUTION INTRAVENOUS; SUBCUTANEOUS
Status: DISCONTINUED | OUTPATIENT
Start: 2025-08-01 | End: 2025-08-02 | Stop reason: HOSPADM

## 2025-08-01 RX ORDER — INSULIN GLARGINE 100 [IU]/ML
68 INJECTION, SOLUTION SUBCUTANEOUS NIGHTLY
Status: DISCONTINUED | OUTPATIENT
Start: 2025-08-01 | End: 2025-08-02 | Stop reason: HOSPADM

## 2025-08-01 RX ORDER — INSULIN LISPRO 100 [IU]/ML
1-25 INJECTION, SOLUTION INTRAVENOUS; SUBCUTANEOUS
Status: DISCONTINUED | OUTPATIENT
Start: 2025-08-02 | End: 2025-08-01

## 2025-08-01 RX ORDER — MAGNESIUM SULFATE HEPTAHYDRATE 500 MG/ML
1000 INJECTION, SOLUTION INTRAMUSCULAR; INTRAVENOUS ONCE
Qty: 2 ML | Refills: 0 | Status: SHIPPED | OUTPATIENT
Start: 2025-08-01 | End: 2025-08-01 | Stop reason: HOSPADM

## 2025-08-01 RX ORDER — GLUCAGON INJECTION, SOLUTION 1 MG/.2ML
INJECTION, SOLUTION SUBCUTANEOUS
Qty: 1 EACH | Refills: 1 | Status: SHIPPED | OUTPATIENT
Start: 2025-08-01

## 2025-08-01 RX ORDER — IBUPROFEN 600 MG/1
1 TABLET ORAL
Qty: 1 KIT | Refills: 0 | Status: SHIPPED | OUTPATIENT
Start: 2025-08-01 | End: 2025-08-01

## 2025-08-01 RX ADMIN — POTASSIUM PHOSPHATE, MONOBASIC POTASSIUM PHOSPHATE, DIBASIC: 224; 236 INJECTION, SOLUTION, CONCENTRATE INTRAVENOUS at 12:23

## 2025-08-01 RX ADMIN — SODIUM CHLORIDE 0.1 UNITS/KG/HR: 9 INJECTION, SOLUTION INTRAVENOUS at 01:04

## 2025-08-01 RX ADMIN — POTASSIUM PHOSPHATE, MONOBASIC POTASSIUM PHOSPHATE, DIBASIC: 224; 236 INJECTION, SOLUTION, CONCENTRATE INTRAVENOUS at 15:46

## 2025-08-01 RX ADMIN — ACETAMINOPHEN 650 MG: 325 TABLET ORAL at 20:17

## 2025-08-01 RX ADMIN — INSULIN GLARGINE 68 UNITS: 100 INJECTION, SOLUTION SUBCUTANEOUS at 22:28

## 2025-08-01 RX ADMIN — POTASSIUM PHOSPHATE, MONOBASIC POTASSIUM PHOSPHATE, DIBASIC: 224; 236 INJECTION, SOLUTION, CONCENTRATE INTRAVENOUS at 14:33

## 2025-08-01 RX ADMIN — POTASSIUM PHOSPHATE, MONOBASIC POTASSIUM PHOSPHATE, DIBASIC: 224; 236 INJECTION, SOLUTION, CONCENTRATE INTRAVENOUS at 01:51

## 2025-08-01 RX ADMIN — POTASSIUM PHOSPHATE, MONOBASIC POTASSIUM PHOSPHATE, DIBASIC: 224; 236 INJECTION, SOLUTION, CONCENTRATE INTRAVENOUS at 22:43

## 2025-08-01 RX ADMIN — METHIMAZOLE 20 MG: 5 TABLET ORAL at 10:09

## 2025-08-01 RX ADMIN — METHIMAZOLE 20 MG: 5 TABLET ORAL at 20:17

## 2025-08-01 RX ADMIN — POTASSIUM PHOSPHATE, MONOBASIC POTASSIUM PHOSPHATE, DIBASIC: 224; 236 INJECTION, SOLUTION, CONCENTRATE INTRAVENOUS at 06:59

## 2025-08-01 RX ADMIN — SODIUM CHLORIDE 0.1 UNITS/KG/HR: 9 INJECTION, SOLUTION INTRAVENOUS at 16:54

## 2025-08-01 RX ADMIN — INSULIN LISPRO 5.5 UNITS: 100 INJECTION, SOLUTION INTRAVENOUS; SUBCUTANEOUS at 20:52

## 2025-08-01 RX ADMIN — MAGNESIUM SULFATE HEPTAHYDRATE 1000 MG: 1 INJECTION, SOLUTION INTRAVENOUS at 16:52

## 2025-08-01 RX ADMIN — CALCIUM GLUCONATE 1000 MG: 20 INJECTION, SOLUTION INTRAVENOUS at 15:45

## 2025-08-01 ASSESSMENT — PAIN SCALES - GENERAL
PAINLEVEL_OUTOF10: 2
PAINLEVEL_OUTOF10: 4

## 2025-08-01 ASSESSMENT — PAIN DESCRIPTION - LOCATION
LOCATION: HEAD
LOCATION: HEAD

## 2025-08-01 NOTE — PROGRESS NOTES
25  0949 am- 1015am         PHYLLIS GUTIÉRREZ, RN, BSN, CPN, WES    Total time: 26 minutes spent with this clinician    WES Encounter with Adeline Vanegas for DKA admission. Ran out of Tresibsa ( basal insulin) 2 weeks ago. Cost was going to be 1800 out of pocket. Second generation basal insulin very expensive other options for cost saving. Patient did not reach out to the office for support during this time. Had kwikpens and ran out of cartridges months ago for inPen dosing . Case management to help with payment for insulin and supplies before discharge.    Reports that the batteries in the meter have , was not aware that she could purchase those at any store.     Didi 3 plus has \" been rejected by her skin\" and does not get through to [facility] is just on hold to have replacement of faulty CGM sensor. Will try to have her place one this afternoon with assistance.     Encouraged weekly calls to PEDA team until upcoming appointment.     Would benefit from meeting with LCSW for ungoing talk therapy.       Note sent to her in [facility]::  Call with blood sugars every Monday [phone] or [facility] until September.   Follow up appointment with Dr Short 2025   Purchase new batteries for meter  Over the counter meter is least expensive at [facility] at 10-20 dollars if need new one  Call when you open your last insulin pen to discuss next steps for refill and if insurance is active  Remove Gvoke from you car  Wear Didi for 15 days      In your diabetes \"Go bag\" you should have the following and carry these where every you go. Do not leave in a car due to temperature changes.     Meter  Test strips  Lancets  Alcohol Pads  3 treatment of low blood sugars ( 15 grams of carbs)  - juice  -skittles  -glucose gel  -glucose tabs  Glucagon/GVOKE- emergency medication for severe low       Blood sugar checks   Breakfast  Lunch   Dinner   Bedtime     Ketostix - check urine for increase stress, ANY illness or off

## 2025-08-01 NOTE — PROGRESS NOTES
Inpatient Child Life Progress Note    Patient Name: Adeline Vanegas  MRN: [id number]  Age: 19 y.o.  : 2005  Date: 2025      Initial Contact: This Certified Child Life Specialist (CCLS) introduced services and offered psychosocial support to assist with current hospitalization and assess patient's coping needs. Patient was fully engaged with this CCLS, demonstrated by building rapport and maintaining eye contact. Patient was accompanied by her mom throughout encounter.     Psychosocial Support: CCLS utilized active listening while patient and mom spoke about current hospitalization and diagnosis. Patient shared that she has done well with insulin injections and glucose checks, however, living with Type 1 diabetes has been difficult. Also per mom and patient, due to insurance issues, patient has had difficulty obtaining supplies and has attempted to stretch out materials to last her until issues were rectified. CCLS validated experiences/statements and continued to engage patient in normalizing rapport to assist with alternative focus while building therapeutic relationship.     Patient shared that she likes to eat, has 3 siblings (ages 17, 15, and 6), works security for a school, likes to shop, and likes to travel; patient shared that she wants to travel to [facility] and [facility]. CCLS provided additional validation and continued to build rapport surrounding interests to assist with positive coping and normalization.      Diagnostic Education/Psychosocial Support: CCLS offered developmentally appropriate Diabetic education review to assist with assessing patient's understanding of diagnosis, assist with clearing potential misconceptions, and normalize diagnosis. Patient receptive to diagnostic education and appeared to have a firm understanding demonstrated through teach back method. CCLS also provided a listening presence as patient spoke about Diabetic experiences to assist with positive coping,

## 2025-08-01 NOTE — PROGRESS NOTES
Pediatric Critical Care Daily Progress Note    Subjective:     Admission Date: 7/31/2025     HD # 2    Interval history:  Remains on 2 bag DKA protocol with insulin gtt at 0.1 u/kg/hr. Fluids currently at 200 ml/hr.   Labs significant for hypokalemia, hypomagnesemia, hyperchloremia, hypocalcemia, hypophosphatemia, and metabolic acidosis.   Have increased potassium phosphate in IVF and replaced a significant portion of the sodium chloride with sodium acetate. Have also given bolus doses of magnesium sulfate and calcium gluconate. AG has closed and is WNL. Metabolic acidosis is likely secondary to hyperchloremia and and should correct with replacement of chloride with acetate.   Patient is overall feeling better.   Discharge planning completed. Patient to receive Humalog, Lantis, glucagon, and methimazole from [facility] pharmacy by 4:00 PM today. MOC to purchase glucometer, strips, [facility]. Patient has received urine ketone strips and needles from [facility] pharmacy.   Patient is no longer taking atenolol, trazodone, or sertraline.    Current Facility-Administered Medications   Medication Dose Route Frequency    magnesium sulfate 1000 mg in dextrose 5% 100 mL IVPB  1,000 mg IntraVENous Once    sodium acetate 77 mEq, potassium phosphate 60 mEq in dextrose 10 % 1,000 mL infusion   IntraVENous Continuous    sodium acetate 77 mEq, potassium phosphate 60 mEq in sodium chloride 0.45 % 1,000 mL infusion   IntraVENous Continuous    calcium gluconate 1,000 mg in sodium chloride 50 mL  1,000 mg IntraVENous Once    insulin regular (HumuLIN R;NovoLIN R) 100 Units in sodium chloride 0.9 % 100 mL infusion  0.1 Units/kg/hr IntraVENous Continuous    methIMAzole (TAPAZOLE) tablet 20 mg  20 mg Oral BID    0.9 % sodium chloride infusion   IntraVENous Continuous    acetaminophen (TYLENOL) tablet 650 mg  650 mg Oral Q4H PRN         Objective:     /72   Pulse 99   Temp 98.4 °F (36.9 °C) (Oral)   Resp 20   Ht 1.6 m (5' 3\")    MG/DL    POC Creatinine 0.6 0.6 - 1.3 MG/DL    eGFR, POC >90 >60 ml/min/1.73m2    POC Ionized Calcium 1.04 (L) 1.15 - 1.33 mmol/L    POC Lactic Acid 2.53 (HH) 0.40 - 2.00 mmol/L    Source VENOUS BLOOD      Critical Value Read Back EDWARD    POCT Glucose    Collection Time: 08/01/25  1:12 PM   Result Value Ref Range    POC Glucose 93 65 - 117 mg/dL    Performed by: Loli Blair RN    POCT Glucose    Collection Time: 08/01/25  2:11 PM   Result Value Ref Range    POC Glucose 96 65 - 117 mg/dL    Performed by: Loli Blair RN    POCT Glucose    Collection Time: 08/01/25  3:53 PM   Result Value Ref Range    POC Glucose 73 65 - 117 mg/dL    Performed by: Loli Blair RN          Assessment:   19 y.o. female with poorly controlled DM1  who is admitted with DKA.    Patient at risk for acute life threatening cardiac and neurological deterioration requiring immediate life saving interventions.    Principal Problem:    Diabetic ketoacidosis without coma (HCC)  Active Problems:    Type 1 diabetes mellitus with ketoacidosis without coma (HCC)  Resolved Problems:    * No resolved hospital problems. *      Plan:   FEN:   Remains on 2 bag DKA protocol with insulin gtt at 0.1 u/kg/hr. Fluids currently at 200 ml/hr.   Labs significant for hypokalemia, hypomagnesemia, hyperchloremia, hypocalcemia, hypophosphatemia, and metabolic acidosis.   Have increased potassium phosphate in IVF and replaced a significant portion of the sodium chloride with sodium acetate. Have also given bolus doses of magnesium sulfate and calcium gluconate. AG has closed and is WNL. Metabolic acidosis is likely secondary to hyperchloremia and and should correct with replacement of chloride with acetate.   Cont. To follow BMP q 4 hrs.  Discharge planning completed as noted above. Appreciate input from Peds Endocrinology.     Resp:   MAG  Cont. Pox and CRM    CV:   HDS    Heme:   No active issues    GI:  No active issues    RENAL:  No active

## 2025-08-01 NOTE — CARE COORDINATION
Transition of Care Plan:    RUR: 12%--low  Prior Level of Functioning: independent  Disposition: home w/family  MAURICE: 8/2  Follow up appointments: endocrinology, pediatrician  DME needed: N/A  Transportation at discharge: in car w/parent  Caregiver Contact: Tiffanie Oenil, mother, [phone]   Discharge Caregiver contacted prior to discharge? yes  Care Conference needed? no  Barriers to discharge:  none noted    Patient admitted for DKA with history of Diabetes type 1, Graves disease, cataracts and PTSD. Patient's Medicaid application is pending and she has no access to prescription assistance for her diabetes medications. CM used Medication Assistance to cover medications at the [facility] outpatient pharmacy. CM will continue to follow for additional needs.    1445  Additional medications added to patient's pharmacy needs. Medications covered with medication assistance and family will be responsible for purchasing glucometer and testing kit.    Yael Tipton LMSW  [facility]  [phone]

## 2025-08-01 NOTE — DISCHARGE INSTRUCTIONS
Call with blood sugars every Monday [phone] or Impul[employer]hart until September.   Follow up appointment with Dr Short 9/5/2025   Purchase new batteries for meter  Over the counter meter is least expensive at [facility] at 10-20 dollars if need new one  Call when you open your last insulin pen of each to discuss next steps for refill and if insurance is active  Remove Gvoke from you car  Wear Didi for 15 days   Goal BG  over time for best outcomes and health      In your diabetes \"Go bag\" you should have the following and carry these where every you go. Do not leave in a car due to temperature changes.     Meter  Test strips  Lancets  Alcohol Pads  3 treatment of low blood sugars ( 15 grams of carbs)  - juice  -skittles  -glucose gel  -glucose tabs  Glucagon/GVOKE- emergency medication for severe low       Blood sugar checks   Breakfast  Lunch   Dinner   Bedtime     Ketostix - check urine for increase stress, ANY illness or off feeling, blood sugar > 350 twice in any 24 hour time frame - if moderate to large call [phone] and page provider         Insulin Instructions      Fixed Dose Injections   Lantus SoloStar 100 UNIT/ML Sopn   Last edited by Merline Sarkar, RN on 8/1/2025 at 10:18 AM       Time of Day Dose (units)   AM 68            Mealtime Injections   HumaLOG Ruddy KwikPen 100 UNIT/ML Sopn   Last edited by Merline Sarkar, RN on 8/1/2025 at 10:18 AM       ALL   BG Target   100 mg/dL       Sensitivity Factor Carb Ratio   20 mg/dL/unit 6 g/unit       BG (mg/dL) Insulin (units) Carbs (g) Insulin (units)   100 - 109     0 0     0   110 - 119     0.5 3     0.5   120 - 129     1 6     1   130 - 139     1.5 9     1.5   140 - 149     2 12     2   150 - 159     2.5 15     2.5   160 - 169     3 18     3   170 - 179     3.5 21     3.5   180 - 189     4 24     4   190 - 199     4.5 27     4.5   200 - 209     5 30     5   210 - 219     5.5 33     5.5   220 - 229     6 36     6   230 - 239     6.5 39

## 2025-08-01 NOTE — TELEPHONE ENCOUNTER
HELENA from Dr Sierra for below medication    Requested Prescriptions     Signed Prescriptions Disp Refills    insulin lispro, 0.5 Unit Dial, (HUMALOG SHIKHA KWIKPEN) 100 UNIT/ML SOPN 30 mL 0     Sig: Meal time insulin subcutaneously up to 6x daily, inject up to 100 units daily     Authorizing Provider: JAZIEL OLMOS     Ordering User: PHYLLIS GUTIÉRREZ    insulin glargine (LANTUS SOLOSTAR) 100 UNIT/ML injection pen 10 Adjustable Dose Pre-filled Pen Syringe 0     Sig: Inject 68 units daily, (max 85 units increased only by call to provider at [phone].)     Authorizing Provider: JAZIEL OLMOS     Ordering User: PHYLLIS GUTIÉRREZ    acetone, urine, test strip 100 strip 2     Sig: Check urine ketones with any illness or BG >350x2 or stress --If moderate to large call provider. Dispense 1 home 1 school ( only good for 6 months once opened)     Authorizing Provider: JAZIEL OLMOS     Ordering User: PHYLLIS GUTIÉRREZ    Insulin Pen Needle (UNIFINE PENTIPS PLUS) 32G X 4 MM MISC 200 each 0     Si each by Does not apply route 6 times daily Used to inject insulin subcutaneously up to 6x daily     Authorizing Provider: JAZIEL OLMOS     Ordering User: PHYLLIS GUTIÉRREZ    Glucagon, rDNA, (GLUCAGON EMERGENCY) 1 MG KIT 1 kit 0     Sig: Inject 1 mg into the muscle once as needed (severe hypogylcemia and unconsciousness) Dispense 1 kit for home 1 for school     Authorizing Provider: JAZIEL OLMOS     Ordering User: PHYLLIS GUTIÉRREZ

## 2025-08-01 NOTE — PROGRESS NOTES
08/01/25   3:33 PM      Met with mother and Adeline. Set up mother's twtMobhart for sharing data.     Reviewed discharge plan in F[employer]    Patient will enter BG and Carbs into inpen ana for dosing calculations or use the [employer] message dosing.       Dr Sierra at bedside.    Mother to  meter at [facility]       Patient refused at this time to place the CGM - Didi 3 plus to her arm. Educator offered to be present for support for successful insert. Patient does not want to do. Meter and CGM sample give to mother.       Patient will be going home with medication from pharmacy.     PHYLLIS GUTIÉRREZ RN [facility]
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
## 2025-08-01 NOTE — TELEPHONE ENCOUNTER
HELENA from Dr Sierra for below medication updated with samples     Requested Prescriptions     Signed Prescriptions Disp Refills    insulin lispro, 0.5 Unit Dial, (HUMALOG SHIKHA KWIKPEN) 100 UNIT/ML SOPN 30 mL 0     Sig: Meal time insulin subcutaneously up to 6x daily, inject up to 100 units daily     Authorizing Provider: JAZIEL OLMOS     Ordering User: PHYLLIS GUTIÉRREZ    insulin glargine (LANTUS SOLOSTAR) 100 UNIT/ML injection pen 10 Adjustable Dose Pre-filled Pen Syringe 0     Sig: Inject 68 units daily, (max 85 units increased only by call to provider at [phone].)     Authorizing Provider: JAZIEL OLMOS     Ordering User: PHYLLIS GUTIÉRREZ    acetone, urine, test strip 100 strip 2     Sig: Check urine ketones with any illness or BG >350x2 or stress --If moderate to large call provider. Dispense 1 home 1 school ( only good for 6 months once opened)     Authorizing Provider: JAZIEL OLMOS     Ordering User: PHYLLIS GUTIÉRREZ    Insulin Pen Needle (UNIFINE PENTIPS PLUS) 32G X 4 MM MISC 200 each 0     Si each by Does not apply route 6 times daily Used to inject insulin subcutaneously up to 6x daily     Authorizing Provider: JAZIEL OLMOS     Ordering User: PHYLLIS GUTIÉRREZ    Blood Glucose Monitoring Suppl (GLUCOCARD 01 BLOOD GLUCOSE) w/Device KIT 1 kit 0     Sig: Check blood sugars 4 x daily every day ----over the counter strips available at [facility].     Authorizing Provider: JAZIEL OLMOS     Ordering User: PHYLLIS GUTIÉRREZ    Continuous Glucose Sensor (FREESTYLE SAMIA 3 SENSOR) MISC 1 each 0     Sig: Used to monitor blood sugars and trends- change every 14 days. If lows confirm with finger stick VORB Dr Sierra     Authorizing Provider: JAZIEL OLMOS     Ordering User: PHYLLIS GUTIÉRREZ

## 2025-08-01 NOTE — PROGRESS NOTES
Cc: Type 1 diabetes- poorly controlled and poor compliance with medication         Admitted for Diabetes ketoacidosis    [facility]: Patient is 19 year old with type 1 diabetes admitted for diabetes ketoacidosis. The patient is currently on Insulin regimen at home which includes humalog  before each meal and Tresiba once a day. Compliance with medication: poor. Patient could not have medication for diabetes covered. She has been titration herself less insulin dose to have her supplies last long.  Overall glycemic control has been poor. She presented with respiratory distress, lethargy and labs was consistent with hyperglycemia and was in severe DKA.  She is on IV insulin therapy and IV fluids management.    She has hyperthyroidism and is on methimazole.    She is on methimazole 10 mg tab 1 tab po twice a day. Claims good compliance with the medication.    Her clinical course is getting better and she is able to talk and feels better and wants to eat. No resp distress.    Past Medical History:   Diagnosis Date    Asthma     Chronic idiopathic constipation 04/25/2018    Concussion     Diabetes (HCC)     Hyperthyroidism 11/20/2020    Migraines     Otitis media        Past Surgical History:   Procedure Laterality Date    CATARACT EXTRACTION Bilateral     TYMPANOSTOMY TUBE PLACEMENT      WISDOM TOOTH EXTRACTION         Social History     Socioeconomic History    Marital status: Single     Spouse name: Not on file    Number of children: Not on file    Years of education: Not on file    Highest education level: Not on file   Occupational History    Not on file   Tobacco Use    Smoking status: Never    Smokeless tobacco: Never   Substance and Sexual Activity    Alcohol use: No    Drug use: No    Sexual activity: Not on file   Other Topics Concern    Not on file   Social History Narrative    Not on file     Social Drivers of Health     Financial Resource Strain: Not on file   Food Insecurity: No Food Insecurity (7/31/2025)

## 2025-08-02 VITALS
OXYGEN SATURATION: 100 % | SYSTOLIC BLOOD PRESSURE: 123 MMHG | WEIGHT: 136.91 LBS | HEART RATE: 93 BPM | RESPIRATION RATE: 18 BRPM | DIASTOLIC BLOOD PRESSURE: 87 MMHG | TEMPERATURE: 97.8 F | BODY MASS INDEX: 24.26 KG/M2 | HEIGHT: 63 IN

## 2025-08-02 LAB
ANION GAP SERPL CALC-SCNC: 13 MMOL/L (ref 2–14)
ANION GAP SERPL CALC-SCNC: 16 MMOL/L (ref 2–14)
BUN SERPL-MCNC: 3 MG/DL (ref 6–20)
BUN SERPL-MCNC: 4 MG/DL (ref 6–20)
BUN/CREAT SERPL: 7 (ref 12–20)
CALCIUM SERPL-MCNC: 8.8 MG/DL (ref 8.6–10)
CALCIUM SERPL-MCNC: 9 MG/DL (ref 8.6–10)
CHLORIDE SERPL-SCNC: 105 MMOL/L (ref 98–107)
CHLORIDE SERPL-SCNC: 106 MMOL/L (ref 98–107)
CO2 SERPL-SCNC: 18 MMOL/L (ref 20–29)
CO2 SERPL-SCNC: 19 MMOL/L (ref 20–29)
CREAT SERPL-MCNC: 0.6 MG/DL (ref 0.6–1)
CREAT SERPL-MCNC: 0.64 MG/DL (ref 0.6–1)
GLUCOSE BLD STRIP.AUTO-MCNC: 102 MG/DL (ref 65–117)
GLUCOSE BLD STRIP.AUTO-MCNC: 56 MG/DL (ref 65–117)
GLUCOSE BLD STRIP.AUTO-MCNC: 58 MG/DL (ref 65–117)
GLUCOSE BLD STRIP.AUTO-MCNC: 59 MG/DL (ref 65–117)
GLUCOSE BLD STRIP.AUTO-MCNC: 64 MG/DL (ref 65–117)
GLUCOSE BLD STRIP.AUTO-MCNC: 66 MG/DL (ref 65–117)
GLUCOSE BLD STRIP.AUTO-MCNC: 78 MG/DL (ref 65–117)
GLUCOSE BLD STRIP.AUTO-MCNC: 88 MG/DL (ref 65–117)
GLUCOSE BLD STRIP.AUTO-MCNC: 92 MG/DL (ref 65–117)
GLUCOSE BLD STRIP.AUTO-MCNC: 99 MG/DL (ref 65–117)
GLUCOSE SERPL-MCNC: 61 MG/DL (ref 65–100)
GLUCOSE SERPL-MCNC: 95 MG/DL (ref 65–100)
MAGNESIUM SERPL-MCNC: 1.6 MG/DL (ref 1.7–2.2)
MAGNESIUM SERPL-MCNC: 1.6 MG/DL (ref 1.7–2.2)
PHOSPHATE SERPL-MCNC: 4 MG/DL (ref 2.5–4.5)
POTASSIUM SERPL-SCNC: 3.2 MMOL/L (ref 3.5–5.1)
POTASSIUM SERPL-SCNC: 3.5 MMOL/L (ref 3.5–5.1)
SERVICE CMNT-IMP: ABNORMAL
SERVICE CMNT-IMP: NORMAL
SODIUM SERPL-SCNC: 136 MMOL/L (ref 136–145)
SODIUM SERPL-SCNC: 139 MMOL/L (ref 136–145)

## 2025-08-02 PROCEDURE — 83735 ASSAY OF MAGNESIUM: CPT

## 2025-08-02 PROCEDURE — 84100 ASSAY OF PHOSPHORUS: CPT

## 2025-08-02 PROCEDURE — 80048 BASIC METABOLIC PNL TOTAL CA: CPT

## 2025-08-02 PROCEDURE — 6370000000 HC RX 637 (ALT 250 FOR IP): Performed by: PEDIATRICS

## 2025-08-02 PROCEDURE — 82962 GLUCOSE BLOOD TEST: CPT

## 2025-08-02 PROCEDURE — 99222 1ST HOSP IP/OBS MODERATE 55: CPT | Performed by: PEDIATRICS

## 2025-08-02 RX ORDER — INSULIN GLARGINE 100 [IU]/ML
INJECTION, SOLUTION SUBCUTANEOUS
Qty: 10 ADJUSTABLE DOSE PRE-FILLED PEN SYRINGE | Refills: 0 | Status: SHIPPED | OUTPATIENT
Start: 2025-08-02

## 2025-08-02 RX ORDER — MAGNESIUM SULFATE 1 G/100ML
1000 INJECTION INTRAVENOUS ONCE
Status: DISCONTINUED | OUTPATIENT
Start: 2025-08-02 | End: 2025-08-02 | Stop reason: HOSPADM

## 2025-08-02 RX ORDER — LANOLIN ALCOHOL/MO/W.PET/CERES
400 CREAM (GRAM) TOPICAL EVERY 4 HOURS
Status: COMPLETED | OUTPATIENT
Start: 2025-08-02 | End: 2025-08-02

## 2025-08-02 RX ORDER — LOPERAMIDE HYDROCHLORIDE 2 MG/1
2 CAPSULE ORAL
Status: COMPLETED | OUTPATIENT
Start: 2025-08-02 | End: 2025-08-02

## 2025-08-02 RX ORDER — INSULIN LISPRO 100 [IU]/ML
1-25 INJECTION, SOLUTION INTRAVENOUS; SUBCUTANEOUS
Qty: 10 ML | Refills: 1 | Status: SHIPPED
Start: 2025-08-02

## 2025-08-02 RX ADMIN — INSULIN LISPRO 5 UNITS: 100 INJECTION, SOLUTION INTRAVENOUS; SUBCUTANEOUS at 13:32

## 2025-08-02 RX ADMIN — DIBASIC SODIUM PHOSPHATE, MONOBASIC POTASSIUM PHOSPHATE AND MONOBASIC SODIUM PHOSPHATE 2 TABLET: 852; 155; 130 TABLET ORAL at 13:12

## 2025-08-02 RX ADMIN — INSULIN LISPRO 9 UNITS: 100 INJECTION, SOLUTION INTRAVENOUS; SUBCUTANEOUS at 15:43

## 2025-08-02 RX ADMIN — METHIMAZOLE 20 MG: 5 TABLET ORAL at 08:31

## 2025-08-02 RX ADMIN — DIBASIC SODIUM PHOSPHATE, MONOBASIC POTASSIUM PHOSPHATE AND MONOBASIC SODIUM PHOSPHATE 2 TABLET: 852; 155; 130 TABLET ORAL at 08:31

## 2025-08-02 RX ADMIN — Medication 400 MG: at 09:19

## 2025-08-02 RX ADMIN — Medication 400 MG: at 13:12

## 2025-08-02 RX ADMIN — LOPERAMIDE HYDROCHLORIDE 2 MG: 2 CAPSULE ORAL at 09:19

## 2025-08-02 NOTE — DISCHARGE SUMMARY
PED DISCHARGE SUMMARY      Patient: Adeline Vanegas MRN: [id number]  SSN: [id number]    YOB: 2005  Age: 19 y.o.  Sex: female      Admitting Diagnosis: DKA, type 1, not at goal (HCC) [E10.10]  Type 1 diabetes mellitus with ketoacidosis without coma (HCC) [E10.10]    Discharge Diagnosis: Principal Problem:    Diabetic ketoacidosis without coma (HCC)  Active Problems:    Type 1 diabetes mellitus with ketoacidosis without coma (HCC)  Resolved Problems:    * No resolved hospital problems. *      Primary Care Physician: Denice Cobos MD    HPI: 19 year old with history of IDDM, Graves Disease and PTSD who presented to the ED tonight with the complaint of labored breathing and elevated blood sugar level.  Patient states she has run out of her long acting insulin due to insurance issues with coverage and has been rationing her short acting insulin with her last dose being last night. Today with labored breathing and headache/abdominal pain which felt like the last time she was in DKA so she came to the hospital.  Denies any fevers, URI symptoms, V/D at home.    In the ED she was noted to have severe metabolic acidosis, Kussmaul respiratory pattern and hyperglycemia consistent with DKA and was given a NS bolus and admitted for further management and close neurologic monitoring.      Past Medical History        Past Medical History:   Diagnosis Date    Asthma      Chronic idiopathic constipation 04/25/2018    Concussion      Diabetes (HCC)      Hyperthyroidism 11/20/2020    Migraines      Otitis media           Past Surgical History         Past Surgical History:   Procedure Laterality Date    CATARACT EXTRACTION Bilateral      TYMPANOSTOMY TUBE PLACEMENT        WISDOM TOOTH EXTRACTION             Home Medications           Prior to Admission medications    Medication Sig Start Date End Date Taking? Authorizing Provider   Insulin Degludec (TRESIBA FLEXTOUCH) 100 UNIT/ML SOPN AS DIRECTED INJECT UP TO 70 UNITS

## 2025-08-02 NOTE — CONSULTS
Cc: Type 1 diabetes- poorly controlled and poor compliance with medication         Admitted for Diabetes ketoacidosis    HPI: Patient is 19 year old with type 1 diabetes admitted for diabetes ketoacidosis. The patient is currently on Insulin regimen at home which includes humalog  before each meal and Tresiba once a day. Compliance with medication: poor. Patient could not have medication for diabetes covered. She has been titration herself less insulin dose to have her supplies last long.  Overall glycemic control has been poor. She presented with respiratory distress, lethargy and labs was consistent with hyperglycemia and was in severe DKA.      She was transitioned off of insulin last night and lantus of 68 Units was given at 10 pm.   She had low blood sugars since 2 am and needed treatment.   Her clinical course is getting better and she is able to talk and feels better and wants to eat. No resp distress.    She has hyperthyroidism and is on methimazole.    She is on methimazole 10 mg tab 1 tab po twice a day. Claims good compliance with the medication.    Past Medical History:   Diagnosis Date    Asthma     Chronic idiopathic constipation 04/25/2018    Concussion     Diabetes (HCC)     Hyperthyroidism 11/20/2020    Migraines     Otitis media        Past Surgical History:   Procedure Laterality Date    CATARACT EXTRACTION Bilateral     TYMPANOSTOMY TUBE PLACEMENT      WISDOM TOOTH EXTRACTION         Social History     Socioeconomic History    Marital status: Single     Spouse name: Not on file    Number of children: Not on file    Years of education: Not on file    Highest education level: Not on file   Occupational History    Not on file   Tobacco Use    Smoking status: Never    Smokeless tobacco: Never   Substance and Sexual Activity    Alcohol use: No    Drug use: No    Sexual activity: Not on file   Other Topics Concern    Not on file   Social History Narrative    Not on file     Social Drivers of Health

## 2025-08-02 NOTE — PROGRESS NOTES
0247: patient stated she felt like she was having a low, BG checked at this time and noted to be 56. Apple juice and oreos given to patient. Repeat BG at 0330 99.    0545: checked BG while drawing labs because of low overnight. BG 59, gave patient apple juice. Repeat BG at 0630

## 2025-08-04 ENCOUNTER — TELEPHONE (OUTPATIENT)
Age: 20
End: 2025-08-04

## 2025-09-05 ENCOUNTER — OFFICE VISIT (OUTPATIENT)
Age: 20
End: 2025-09-05

## 2025-09-05 VITALS
OXYGEN SATURATION: 100 % | TEMPERATURE: 98.2 F | HEART RATE: 88 BPM | DIASTOLIC BLOOD PRESSURE: 70 MMHG | BODY MASS INDEX: 26.05 KG/M2 | HEIGHT: 64 IN | RESPIRATION RATE: 16 BRPM | SYSTOLIC BLOOD PRESSURE: 123 MMHG | WEIGHT: 152.6 LBS

## 2025-09-05 DIAGNOSIS — E10.65 TYPE 1 DIABETES MELLITUS WITH HYPERGLYCEMIA (HCC): Primary | ICD-10-CM

## 2025-09-05 DIAGNOSIS — E05.00 GRAVES DISEASE: ICD-10-CM

## 2025-09-05 LAB — HBA1C MFR BLD: 12.1 %

## 2025-09-05 RX ORDER — BOLUS INSULIN PUMP, 200 UNIT 2 UNIT
EACH MISCELLANEOUS
Qty: 8 EACH | Refills: 2 | Status: SHIPPED | OUTPATIENT
Start: 2025-09-05

## 2025-09-05 RX ORDER — DIABETIC SUPPLIES,MISCELL
MISCELLANEOUS MISCELLANEOUS
Qty: 1 EACH | Refills: 0 | Status: SHIPPED | OUTPATIENT
Start: 2025-09-05

## 2025-09-05 RX ORDER — HYDROCHLOROTHIAZIDE 12.5 MG/1
CAPSULE ORAL
Qty: 2 EACH | Refills: 3 | Status: SHIPPED | OUTPATIENT
Start: 2025-09-05